# Patient Record
Sex: FEMALE | Race: BLACK OR AFRICAN AMERICAN | Employment: UNEMPLOYED | URBAN - METROPOLITAN AREA
[De-identification: names, ages, dates, MRNs, and addresses within clinical notes are randomized per-mention and may not be internally consistent; named-entity substitution may affect disease eponyms.]

---
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
## 2023-02-24 ENCOUNTER — OFFICE VISIT (OUTPATIENT)
Dept: OBGYN CLINIC | Age: 18
End: 2023-02-24
Payer: COMMERCIAL

## 2023-02-24 VITALS
RESPIRATION RATE: 16 BRPM | BODY MASS INDEX: 23.82 KG/M2 | WEIGHT: 139.5 LBS | HEART RATE: 75 BPM | TEMPERATURE: 97.3 F | OXYGEN SATURATION: 99 % | SYSTOLIC BLOOD PRESSURE: 120 MMHG | HEIGHT: 64 IN | DIASTOLIC BLOOD PRESSURE: 75 MMHG

## 2023-02-24 VITALS
SYSTOLIC BLOOD PRESSURE: 128 MMHG | HEART RATE: 84 BPM | TEMPERATURE: 96.9 F | HEIGHT: 64 IN | DIASTOLIC BLOOD PRESSURE: 79 MMHG | BODY MASS INDEX: 26.22 KG/M2 | RESPIRATION RATE: 16 BRPM | OXYGEN SATURATION: 99 % | WEIGHT: 153.6 LBS

## 2023-02-24 DIAGNOSIS — Z12.39 ENCOUNTER FOR BREAST CANCER SCREENING USING NON-MAMMOGRAM MODALITY: ICD-10-CM

## 2023-02-24 DIAGNOSIS — Z01.419 ENCOUNTER FOR GYNECOLOGICAL EXAMINATION WITHOUT ABNORMAL FINDING: Primary | ICD-10-CM

## 2023-02-24 DIAGNOSIS — Z11.3 VENEREAL DISEASE SCREENING: ICD-10-CM

## 2023-02-24 DIAGNOSIS — F32.A ANXIETY AND DEPRESSION: ICD-10-CM

## 2023-02-24 DIAGNOSIS — F41.9 ANXIETY AND DEPRESSION: ICD-10-CM

## 2023-02-24 PROCEDURE — 99395 PREV VISIT EST AGE 18-39: CPT | Performed by: OBSTETRICS & GYNECOLOGY

## 2023-02-24 NOTE — PROGRESS NOTES
Chief Complaint   Patient presents with    Annual Exam     1. Have you been to the ER, urgent care clinic since your last visit? Hospitalized since your last visit? No    2. Have you seen or consulted any other health care providers outside of the [address] [place] [address] since your last visit? Include any pap smears or colon screening.  No    Visit Vitals  /75 (BP 1 Location: Right arm, BP Patient Position: Sitting, BP Cuff Size: Adult)   Pulse 75   Temp 97.3 °F (36.3 °C) (Temporal)   Resp 16   Ht 5' 4\" (1.626 m)   Wt 139 lb 8 oz (63.3 kg)   SpO2 99%   BMI 23.95 kg/m²

## 2023-02-24 NOTE — PROGRESS NOTES
Chief Complaint   Patient presents with    Annual Exam     1. Have you been to the ER, urgent care clinic since your last visit? Hospitalized since your last visit? No    2. Have you seen or consulted any other health care providers outside of the [address] [place] [address] since your last visit? Include any pap smears or colon screening.  No      Visit Vitals  /79 (BP 1 Location: Left upper arm, BP Patient Position: Sitting, BP Cuff Size: Small adult)   Pulse 84   Temp 96.9 °F (36.1 °C) (Temporal)   Resp 16   Ht 5' 4\" (1.626 m)   Wt 153 lb 9.6 oz (69.7 kg)   SpO2 99%   BMI 26.37 kg/m²

## 2023-02-24 NOTE — PROGRESS NOTES
HPI: Tam Winchester is a 21 y.o. female [de-identified], amenorrheic with Nexplanon in place (left arm),  who presents today for the following:  Chief Complaint   Patient presents with    Annual Exam        She denies abnormal vaginal/vulvar pruritus; abnormal vaginal discharge or vaginal odor. H/o chlamydia.        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   Tobacco Use    Smoking status: Every Day     Packs/day: 0.50     Types: Cigarettes    Smokeless tobacco: Never   Vaping Use    Vaping Use: Some days    Substances: Nicotine   Substance and Sexual Activity    Alcohol use: Yes     Comment: occassion    Drug use: Yes     Types: Marijuana    Sexual activity: Yes     Partners: Male     Birth control/protection: Implant     Comment: h/o Chlamydia   Other Topics Concern    Not on file   Social History Narrative    Not on file     Social Determinants of Health     Financial Resource Strain: Not on file   Food Insecurity: Not on file   Transportation Needs: Not on file   Physical Activity: Sufficiently Active    Days of Exercise per Week: 2 days    Minutes of Exercise per Session: 150+ min   Stress: Not on file   Social Connections: Not on file   Intimate Partner Violence: Not At Risk    Fear of Current or Ex-Partner: No    Emotionally Abused: No    Physically Abused: No    Sexually Abused: No   Housing Stability: Not on file       Allergies   Allergen Reactions    Banana Anaphylaxis          Current Outpatient Medications:     Nexplanon 68 mg impl, , Disp: , Rfl: Review of Systems: Denies issues with eyes, ears, mouth, nose. Denies fevers/chills. 19 lb weight gain in past year. Last night had nausea and headache. Denies chest pain, shortness of breath, vomiting, constipation, diarrhea or abdominal pain. Denies dysuria. Denies muscle aches, weakness, numbness or tingling. Denies issues with breasts. Denies bleeding/clotting d/o's. +Anxiety/depression. Has thought of injuring self at times, last occurred 2 days ago, has no plan. Does not have psychiatrist but sees guidance counselor. Denies HI. OBJECTIVE:  /75 (BP 1 Location: Right arm, BP Patient Position: Sitting, BP Cuff Size: Adult)   Pulse 75   Temp 97.3 °F (36.3 °C) (Temporal)   Resp 16   Ht 5' 4\" (1.626 m)   Wt 139 lb 8 oz (63.3 kg)   SpO2 99%   BMI 23.95 kg/m²      Constitutional  Appearance: well-nourished, well developed, alert, in no acute distress    HENT  Head and Face: appears normal    Neck  Inspection/Palpation: normal appearance      Breasts  Symmetric, no palpable masses, no tenderness, no skin changes, no nipple abnormality, no nipple discharge, no axillary or supraclavicular lymphadenopathy.     Chest  Respiratory Effort: normal      Gastrointestinal  Abdominal Examination: abdomen non-tender to palpation, no masses present  Liver and spleen: no hepatomegaly present, spleen not palpable      Genitourinary  External Genitalia: normal appearance for age, no discharge present, no tenderness present, no inflammatory lesions present, no masses present, no atrophy present  Vagina: normal vaginal vault without central or paravaginal defects, no discharge present, no inflammatory lesions present, no masses present  Bladder: non-tender to palpation  Urethra: appears normal  Cervix: normal, no cervical motion tenderness, scant brown blood noted, swab obtained  Uterus: normal size, shape and consistency  Adnexa: no adnexal tenderness present, no adnexal masses present  Perineum: perineum within normal limits, no evidence of trauma, no rashes or skin lesions present  Anus: anus within normal limits, no hemorrhoids present    Skin  General Inspection: no rash, no lesions identified, Nexplanon palpable in left arm    Neurologic/Psychiatric  Mental Status:  Orientation: grossly oriented to person, place and time  Mood and Affect: mood normal, affect appropriate      Assessment/plan:    ICD-10-CM ICD-9-CM    1. Encounter for gynecological examination without abnormal finding  Z01.419 V72.31       2. Encounter for breast cancer screening using non-mammogram modality  Z12.39 V76.10       3. Venereal disease screening  Z11.3 V74.5 HIV 1/2 AG/AB, 4TH GENERATION,W RFLX CONFIRM      HEPATITIS C AB, RFLX TO QT BY PCR      HSV TYPE 2-SPECIFIC ABS, IGG W/REFL SUPPLEMENTAL TESTING      RPR      CT/NG/T.VAGINALIS AMPLIFICATION      4. Anxiety and depression  F41.9 300.00 REFERRAL TO CHILD/ADOLESCENT PSYCHIATRY    F32.A 311            -Annual gynecologic exam.    -Cervical cancer screening- pap smears starting at age 24.     -Breast cancer screening- breast awareness discussed; mammograms beginning at age 36.    -STI screening-accepts testing: GCT, HIV, RPR, HSV, HCV ordered. -HPV vaccination- counseled. States she has been vaccinated. -Nexplanon to be replaced in Dec 2023.

## 2023-02-24 NOTE — PROGRESS NOTES
HPI: Eliezer Ceja is a 21 y.o. female [de-identified], LMP Nov 2020, Nexplanon in right arm since Dec 2020,  who presents today for the following:  Chief Complaint   Patient presents with    Annual Exam        She denies abnormal vaginal/vulvar pruritus; abnormal vaginal discharge or vaginal odor. Denies h/o STIs. She plans on returning for Nexplanon removal/re-insertion in Dec 2023. Past Medical History:   Diagnosis Date    ADHD (attention deficit hyperactivity disorder)     Depression        History reviewed. No pertinent surgical history.     Family History   Problem Relation Age of Onset    Hypertension Mother     Asthma Mother     Hypertension Maternal Grandmother     Hypertension Maternal Grandfather     Cancer Neg Hx        Social History     Socioeconomic History    Marital status: SINGLE     Spouse name: Not on file    Number of children: Not on file    Years of education: Not on file    Highest education level: 11th grade   Occupational History    Occupation: [employer]    Tobacco Use    Smoking status: Former     Packs/day: 0.25     Types: Cigarettes    Smokeless tobacco: Never   Vaping Use    Vaping Use: Every day    Substances: Nicotine   Substance and Sexual Activity    Alcohol use: Yes     Comment: social celebrations    Drug use: Yes     Types: Marijuana    Sexual activity: Yes     Partners: Male     Birth control/protection: Implant     Comment: denies h/o STIs   Other Topics Concern    Not on file   Social History Narrative    Not on file     Social Determinants of Health     Financial Resource Strain: Not on file   Food Insecurity: Not on file   Transportation Needs: Not on file   Physical Activity: Sufficiently Active    Days of Exercise per Week: 7 days    Minutes of Exercise per Session: 60 min   Stress: Not on file   Social Connections: Not on file   Intimate Partner Violence: Not on file   Housing Stability: Not on file       No Known Allergies       Current Outpatient Medications: albuterol (PROVENTIL HFA, VENTOLIN HFA, PROAIR HFA) 90 mcg/actuation inhaler, inhale 2 puffs by mouth and INTO THE LUNGS every 4 hours if needed, Disp: , Rfl:     Nexplanon 68 mg impl, , Disp: , Rfl:            Review of Systems: Denies issues with eyes, ears, mouth, nose. Denies fevers/chills, significant weight loss/gain. Denies chest pain, shortness of breath, nausea, vomiting, constipation, diarrhea or abdominal pain. Denies dysuria. Lower back pain. Denies muscle weakness, numbness or tingling. Denies issues with breasts. Denies bleeding/clotting d/o's. Occasional anxiety/depression. Denies S/HI. OBJECTIVE:  /79 (BP 1 Location: Left upper arm, BP Patient Position: Sitting, BP Cuff Size: Small adult)   Pulse 84   Temp 96.9 °F (36.1 °C) (Temporal)   Resp 16   Ht 5' 4\" (1.626 m)   Wt 153 lb 9.6 oz (69.7 kg)   SpO2 99%   BMI 26.37 kg/m²      Constitutional  Appearance: well-nourished, well developed, alert, in no acute distress    HENT  Head and Face: appears normal    Neck  Inspection/Palpation: normal appearance      Breasts  Symmetric, no palpable masses, no tenderness, no skin changes, no nipple abnormality, no nipple discharge, no axillary or supraclavicular lymphadenopathy.     Chest  Respiratory Effort: normal      Gastrointestinal  Abdominal Examination: abdomen non-tender to palpation, no masses present  Liver and spleen: no hepatomegaly present, spleen not palpable      Genitourinary  External Genitalia: normal appearance for age, no discharge present, no tenderness present, no inflammatory lesions present, no masses present, no atrophy present  Vagina: normal vaginal vault without central or paravaginal defects, no discharge present, no inflammatory lesions present, no masses present  Bladder: non-tender to palpation  Urethra: appears normal  Cervix: normal, no cervical motion tenderness or abnormal discharge, swab obtained  Uterus: normal size, shape and consistency  Adnexa: no adnexal tenderness present, no adnexal masses present  Perineum: perineum within normal limits, no evidence of trauma, no rashes or skin lesions present  Anus: anus within normal limits, no hemorrhoids present    Skin  General Inspection: no rash, no lesions identified, right arm- Nexplanon in place    Neurologic/Psychiatric  Mental Status:  Orientation: grossly oriented to person, place and time  Mood and Affect: mood normal, affect appropriate      Assessment/plan:    ICD-10-CM ICD-9-CM    1. Encounter for gynecological examination without abnormal finding  Z01.419 V72.31       2. Venereal disease screening  Z11.3 V74.5 HIV 1/2 AG/AB, 4TH GENERATION,W RFLX CONFIRM      HEPATITIS C AB, RFLX TO QT BY PCR      HSV TYPE 2-SPECIFIC ABS, IGG W/REFL SUPPLEMENTAL TESTING      RPR      CT/NG/T.VAGINALIS AMPLIFICATION      3. Encounter for breast cancer screening using non-mammogram modality  Z12.39 V76.10            -Annual gynecologic exam.    -Cervical cancer screening- pap smears starting at age 24.     -Breast cancer screening- breast awareness discussed; mammograms beginning at age 36.    -STI screening-accepts testing: G/C/T, HIV, RPR, HSV, HCV ordered. -HPV vaccination- counseled. Reading material given. Advised to go to the health dept to receive these injections if she did not receive it. -RTC for Nexplanon removal/re-insertion when due.

## 2023-02-25 LAB
HCV AB SERPL QL IA: NORMAL
HCV IGG SERPL QL IA: NON REACTIVE
HIV 1+2 AB+HIV1 P24 AG SERPL QL IA: NON REACTIVE
HSV2 IGG SER IA-ACNC: <0.91 INDEX (ref 0–0.9)
RPR SER QL: NON REACTIVE

## 2023-02-27 LAB
C TRACH RRNA SPEC QL NAA+PROBE: NEGATIVE
C TRACH RRNA SPEC QL NAA+PROBE: NEGATIVE
N GONORRHOEA RRNA SPEC QL NAA+PROBE: NEGATIVE
N GONORRHOEA RRNA SPEC QL NAA+PROBE: NEGATIVE
T VAGINALIS RRNA SPEC QL NAA+PROBE: NEGATIVE
T VAGINALIS RRNA SPEC QL NAA+PROBE: NEGATIVE

## 2023-03-02 NOTE — PROGRESS NOTES
Pls let her know Testing came back negative for Hepatitis C, HIV, herpes simplex type 2, and screening for syphilis. Testing for gonorrhea, chlamydia and trichomonas came back negative.

## 2023-03-03 ENCOUNTER — TELEPHONE (OUTPATIENT)
Dept: OBGYN CLINIC | Age: 18
End: 2023-03-03

## 2023-03-03 NOTE — TELEPHONE ENCOUNTER
----- Message from Seferino Aranda MD sent at 3/2/2023  5:26 PM EST -----  Pls let her know Testing came back negative for Hepatitis C, HIV, herpes simplex type 2, and screening for syphilis. Testing for gonorrhea, chlamydia and trichomonas came back negative.

## 2023-03-03 NOTE — TELEPHONE ENCOUNTER
Spoke with patient advised that testing came back negative for Hep C, HIV, herpes simplex type 2, screening for syphilis. Also testing for gonorrhea, chlamydia and trichomonas all negative.

## 2023-03-03 NOTE — TELEPHONE ENCOUNTER
----- Message from Valorie Hodgkins, MD sent at 3/2/2023  5:26 PM EST -----  Pls let her know testing for gonorrhea, chlamydia and trichomonas came back negative.

## 2023-03-06 PROBLEM — F41.9 ANXIETY AND DEPRESSION: Status: ACTIVE | Noted: 2023-03-06

## 2023-03-06 PROBLEM — F32.A ANXIETY AND DEPRESSION: Status: ACTIVE | Noted: 2023-03-06

## 2023-04-20 ENCOUNTER — TELEPHONE (OUTPATIENT)
Dept: OBGYN CLINIC | Age: 18
End: 2023-04-20

## 2023-04-20 NOTE — TELEPHONE ENCOUNTER
Contacted patient back advised that results to her swab were normal and the letter was normal results letter.

## 2023-05-24 RX ORDER — ALBUTEROL SULFATE 90 UG/1
AEROSOL, METERED RESPIRATORY (INHALATION)
COMMUNITY
Start: 2020-11-09

## 2023-05-24 RX ORDER — ETONOGESTREL 68 MG/1
IMPLANT SUBCUTANEOUS
COMMUNITY
Start: 2020-11-12

## 2024-01-16 ENCOUNTER — PROCEDURE VISIT (OUTPATIENT)
Age: 19
End: 2024-01-16
Payer: MEDICAID

## 2024-01-16 VITALS
SYSTOLIC BLOOD PRESSURE: 121 MMHG | WEIGHT: 169.31 LBS | HEIGHT: 64 IN | OXYGEN SATURATION: 99 % | RESPIRATION RATE: 16 BRPM | HEART RATE: 68 BPM | DIASTOLIC BLOOD PRESSURE: 76 MMHG | BODY MASS INDEX: 28.91 KG/M2 | TEMPERATURE: 98 F

## 2024-01-16 DIAGNOSIS — Z30.46 ENCOUNTER FOR REMOVAL AND REINSERTION OF NEXPLANON: Primary | ICD-10-CM

## 2024-01-16 DIAGNOSIS — N94.89 SUPPRESSION OF MENSES: ICD-10-CM

## 2024-01-16 PROCEDURE — 11983 REMOVE/INSERT DRUG IMPLANT: CPT | Performed by: OBSTETRICS & GYNECOLOGY

## 2024-01-16 NOTE — PROGRESS NOTES
that a small percentage of Nexplanon users experience spotting/irregular bleeding with or without cramping for up to 90 days.  Can take NSAIDs if needed for pain.  Oriented to watch for infection or bleeding from implant site, notify office or go to ER if any problems.  All questions answered.  Discussed risks and benefits associated with hormonal treatment, including but not limited to failure rates, failure to protect against STDs, headache, nausea, and blood clots.  Reviewed importance of proper use.  Stressed the importance of not smoking while using hormone therapy due to the increased risk of blood clots and cancer.  Educated the patient to report to ER if she develops severe headache, swelling of her legs or shortness of breath.     Procedures:  Nexplanon implant device removal:  Removal of Nexplanon implant.  Informed consent obtained. 30 seconds Timeout taken. Patient placed in supine position, marked right arm, 2 swabs of alcohol, injected 2-3 mL of 1% lidocaine with epinephrine.  Prepped in Betadine x 3.  Incision made with #15 blade, hemostat used to grasp device, removal of subdermal device in the right arm without any complications. Postprocedure: The patient tolerated the procedure well.   Nexplanon implant device:  Insertion of Nexplanon implant.  Informed consent obtained. 30 seconds Timeout taken.  Understands method is not 100% effective.  Subdermal insertion of the device at the right arm through the removal incision, inserted without difficulty.  Device was easily palpable on exam, Steri-Strip placed x 1, antibiotic ointment applied followed by bandage. Postprocedure: The patient tolerated the procedure well, antibiotic ointment applied followed by bandage, instructed to take NSAID's as directed for cramping, informed her that cramping and spotting are to be expected.  Instructed to call office or ER if any signs of infection.  Follow-up will be scheduled in about 6 weeks.      No results found

## 2024-01-24 ENCOUNTER — PROCEDURE VISIT (OUTPATIENT)
Age: 19
End: 2024-01-24
Payer: MEDICAID

## 2024-01-24 VITALS
DIASTOLIC BLOOD PRESSURE: 77 MMHG | WEIGHT: 155.25 LBS | SYSTOLIC BLOOD PRESSURE: 137 MMHG | BODY MASS INDEX: 26.5 KG/M2 | HEART RATE: 93 BPM | OXYGEN SATURATION: 99 % | RESPIRATION RATE: 16 BRPM | HEIGHT: 64 IN | TEMPERATURE: 97.8 F

## 2024-01-24 DIAGNOSIS — Z30.46 ENCOUNTER FOR REMOVAL AND REINSERTION OF NEXPLANON: Primary | ICD-10-CM

## 2024-01-24 DIAGNOSIS — N94.89 SUPPRESSION OF MENSES: ICD-10-CM

## 2024-01-24 PROCEDURE — 11983 REMOVE/INSERT DRUG IMPLANT: CPT | Performed by: OBSTETRICS & GYNECOLOGY

## 2024-02-29 ENCOUNTER — TELEPHONE (OUTPATIENT)
Age: 19
End: 2024-02-29

## 2024-02-29 NOTE — TELEPHONE ENCOUNTER
2/29/2024 @10:47am-Called [phone] and received recording that the number has been changed, disconnected, or no longer in service. This call is regarding rescheduling appt 3/5/2024 due to Dr. Patel will be out of the office.ww

## 2024-02-29 NOTE — TELEPHONE ENCOUNTER
2/29/2024 @10:41am-Called [phone] and L/M on VM to have patient contact the office at [phone] regarding rescheduling appt 3/5/2024 due to Dr. Bruner will be out of the office.ww

## 2024-03-26 ENCOUNTER — OFFICE VISIT (OUTPATIENT)
Age: 19
End: 2024-03-26
Payer: MEDICAID

## 2024-03-26 VITALS
DIASTOLIC BLOOD PRESSURE: 75 MMHG | BODY MASS INDEX: 29.38 KG/M2 | SYSTOLIC BLOOD PRESSURE: 124 MMHG | WEIGHT: 172.1 LBS | HEIGHT: 64 IN

## 2024-03-26 VITALS
HEIGHT: 64 IN | SYSTOLIC BLOOD PRESSURE: 120 MMHG | DIASTOLIC BLOOD PRESSURE: 69 MMHG | WEIGHT: 151.6 LBS | BODY MASS INDEX: 25.88 KG/M2

## 2024-03-26 DIAGNOSIS — Z11.3 SCREENING FOR STD (SEXUALLY TRANSMITTED DISEASE): ICD-10-CM

## 2024-03-26 DIAGNOSIS — Z01.419 ENCOUNTER FOR ANNUAL ROUTINE GYNECOLOGICAL EXAMINATION: Primary | ICD-10-CM

## 2024-03-26 PROCEDURE — 99395 PREV VISIT EST AGE 18-39: CPT | Performed by: OBSTETRICS & GYNECOLOGY

## 2024-03-26 NOTE — PROGRESS NOTES
Johana is a 18 y.o. female who presents today for the following:    Chief Complaint   Patient presents with    Annual Exam        No Known Allergies       Current Outpatient Medications:     albuterol sulfate HFA (PROVENTIL;VENTOLIN;PROAIR) 108 (90 Base) MCG/ACT inhaler, inhale 2 puffs by mouth and INTO THE LUNGS every 4 hours if needed, Disp: , Rfl:     etonogestrel (NEXPLANON) 68 MG implant, ceived the following from [facility]: Outside name: Nexplanon 68 mg impl, Disp: , Rfl:      Past Medical History:   Diagnosis Date    ADHD (attention deficit hyperactivity disorder)     Depression         History reviewed. No pertinent surgical history.     Family History   Problem Relation Age of Onset    Hypertension Maternal Grandfather     Hypertension Maternal Grandmother     Asthma Mother     Hypertension Mother     Cancer Neg Hx         Social History     Socioeconomic History    Marital status: Single     Spouse name: None    Number of children: None    Years of education: None    Highest education level: None   Tobacco Use    Smoking status: Former     Current packs/day: 0.25     Types: Cigarettes    Smokeless tobacco: Never   Vaping Use    Vaping Use: Every day    Substances: Nicotine   Substance and Sexual Activity    Alcohol use: Yes    Drug use: Yes     Types: Marijuana (Weed)    Sexual activity: Yes     Partners: Male     Birth control/protection: Implant     Comment: denies h/o STIs     Social Determinants of Health     Physical Activity: Sufficiently Active (2/24/2023)    Exercise Vital Sign     Days of Exercise per Week: 7 days     Minutes of Exercise per Session: 60 min        HPI   Here today for annual exam.  Patient is doing well, has no complaints.    Review of Systems     REVIEW OF SYSTEMS:  Constitutional: Negative  HEENT: Negative  Eyes: Negative  Respiratory: Negative  Cardiovascular: Negative  Gastrointestinal: Negative  Genitourinary: Negative  Musculoskeletal: Negative  Skin:

## 2024-03-26 NOTE — PROGRESS NOTES
Izzy is a 18 y.o. female who presents today for the following:    Chief Complaint   Patient presents with    Annual Exam        Allergies   Allergen Reactions    Banana Anaphylaxis          Current Outpatient Medications:     etonogestrel (NEXPLANON) 68 MG implant, ceived the following from [facility]: Outside name: Nexplanon 68 mg impl, Disp: , Rfl:      Past Medical History:   Diagnosis Date    Asthma         Past Surgical History:   Procedure Laterality Date    APPENDECTOMY          Family History   Problem Relation Age of Onset    Cancer Neg Hx     Hypertension Maternal Aunt         Social History     Socioeconomic History    Marital status: Single     Spouse name: None    Number of children: None    Years of education: None    Highest education level: None   Tobacco Use    Smoking status: Every Day     Current packs/day: 0.50     Types: Cigarettes    Smokeless tobacco: Never   Vaping Use    Vaping Use: Every day    Substances: Nicotine   Substance and Sexual Activity    Alcohol use: Yes    Drug use: Yes     Types: Marijuana (Weed)    Sexual activity: Yes     Partners: Male     Birth control/protection: Implant     Comment: h/o Chlamydia     Social Determinants of Health     Physical Activity: Sufficiently Active (2/24/2023)    Exercise Vital Sign     Days of Exercise per Week: 2 days     Minutes of Exercise per Session: 150+ min   Intimate Partner Violence: Not At Risk (2/24/2023)    Humiliation, Afraid, Rape, and Kick questionnaire     Fear of Current or Ex-Partner: No     Emotionally Abused: No     Physically Abused: No     Sexually Abused: No        HPI   Here today for annual exam.  Patient is doing well, has no complaints.    Review of Systems     REVIEW OF SYSTEMS:  Constitutional: Negative  HEENT: Negative  Eyes: Negative  Respiratory: Negative  Cardiovascular: Negative  Gastrointestinal: Negative  Genitourinary: Negative  Musculoskeletal: Negative  Skin: Negative  Neurological:

## 2024-03-29 PROBLEM — A59.9 TRICHOMONIASIS: Status: ACTIVE | Noted: 2024-03-29

## 2024-03-29 LAB
A VAGINAE DNA VAG QL NAA+PROBE: ABNORMAL SCORE
A VAGINAE DNA VAG QL NAA+PROBE: NORMAL SCORE
BVAB2 DNA VAG QL NAA+PROBE: ABNORMAL SCORE
BVAB2 DNA VAG QL NAA+PROBE: NORMAL SCORE
C ALBICANS DNA VAG QL NAA+PROBE: NEGATIVE
C ALBICANS DNA VAG QL NAA+PROBE: NEGATIVE
C GLABRATA DNA VAG QL NAA+PROBE: NEGATIVE
C GLABRATA DNA VAG QL NAA+PROBE: NEGATIVE
C TRACH DNA VAG QL NAA+PROBE: NEGATIVE
C TRACH DNA VAG QL NAA+PROBE: NEGATIVE
MEGA1 DNA VAG QL NAA+PROBE: ABNORMAL SCORE
MEGA1 DNA VAG QL NAA+PROBE: NORMAL SCORE
N GONORRHOEA DNA VAG QL NAA+PROBE: NEGATIVE
N GONORRHOEA DNA VAG QL NAA+PROBE: NEGATIVE
T VAGINALIS DNA VAG QL NAA+PROBE: NEGATIVE
T VAGINALIS DNA VAG QL NAA+PROBE: POSITIVE

## 2024-03-29 NOTE — RESULT ENCOUNTER NOTE
Positive for trichomoniasis.  Please send prescription for Flagyl 500 mg 1 tab p.o. twice daily for 7 days and advise patient to complete.  Please orient patient about the importance of partner treatment.  Follow-up testing recommended in 3 months, please schedule an appointment.  Thank you

## 2024-04-01 ENCOUNTER — TELEPHONE (OUTPATIENT)
Age: 19
End: 2024-04-01

## 2024-04-01 DIAGNOSIS — A59.9 INFECTION DUE TO TRICHOMONAS: Primary | ICD-10-CM

## 2024-04-01 RX ORDER — METRONIDAZOLE 500 MG/1
500 TABLET ORAL 2 TIMES DAILY
Qty: 14 TABLET | Refills: 0 | Status: SHIPPED | OUTPATIENT
Start: 2024-04-01 | End: 2024-04-08

## 2024-04-01 NOTE — TELEPHONE ENCOUNTER
----- Message from Nael Sauceda MD sent at 3/29/2024 12:55 PM EDT -----  Positive for trichomoniasis.  Please send prescription for Flagyl 500 mg 1 tab p.o. twice daily for 7 days and advise patient to complete.  Please orient patient about the importance of partner treatment.  Follow-up testing recommended in 3 months, please schedule an appointment.  Thank you

## 2024-04-01 NOTE — TELEPHONE ENCOUNTER
Informed of results and Rx sent to pharmacy. Advised her to inform Partner to seek treatment, follow up in 3 months. Patient to call if she has any questions